# Patient Record
Sex: MALE | URBAN - METROPOLITAN AREA
[De-identification: names, ages, dates, MRNs, and addresses within clinical notes are randomized per-mention and may not be internally consistent; named-entity substitution may affect disease eponyms.]

---

## 2021-11-18 ENCOUNTER — PREPPED CHART (OUTPATIENT)
Dept: URBAN - METROPOLITAN AREA CLINIC 49 | Facility: CLINIC | Age: 61
End: 2021-11-18

## 2021-11-18 PROBLEM — H35.033 HYPERTENSIVE RETINOPATHY: Noted: 2021-11-18

## 2021-11-18 PROBLEM — Z79.4 DIABETIC MACULAR EDEMA: Noted: 2021-11-18

## 2021-11-18 PROBLEM — H33.322 OPERCULATED TEAR: Noted: 2021-11-18

## 2021-11-18 PROBLEM — E11.3313 DIABETIC MACULAR EDEMA: Noted: 2021-11-18

## 2021-11-18 PROBLEM — Z79.4 DIABETIC MACULAR EDEMA: Status: IMPROVING | Noted: 2021-11-18

## 2021-11-18 PROBLEM — E11.3313 MODERATE NONPROLIFERATIVE DIABETIC RETINOPATHY: Noted: 2021-11-18

## 2021-11-18 PROBLEM — Z79.4 MODERATE NONPROLIFERATIVE DIABETIC RETINOPATHY: Noted: 2021-11-18

## 2021-11-18 PROBLEM — E11.3313 DIABETIC MACULAR EDEMA: Status: IMPROVING | Noted: 2021-11-18

## 2021-11-18 PROBLEM — Z79.4 MODERATE NONPROLIFERATIVE DIABETIC RETINOPATHY: Status: IMPROVING | Noted: 2021-11-18

## 2021-11-18 PROBLEM — E11.3313 MODERATE NONPROLIFERATIVE DIABETIC RETINOPATHY: Status: IMPROVING | Noted: 2021-11-18

## 2021-11-18 PROBLEM — H25.13 NS CATARACT: Noted: 2021-11-18

## 2022-03-14 ASSESSMENT — VISUAL ACUITY
OD_PH: 20/30+2
OS_SC: 20/80+1
OD_SC: 20/50+2

## 2022-03-14 ASSESSMENT — TONOMETRY
OS_IOP_MMHG: 12
OD_IOP_MMHG: 12

## 2022-03-17 ENCOUNTER — FOLLOW UP (OUTPATIENT)
Dept: URBAN - METROPOLITAN AREA CLINIC 49 | Facility: CLINIC | Age: 62
End: 2022-03-17

## 2022-03-17 DIAGNOSIS — E11.3313: ICD-10-CM

## 2022-03-17 DIAGNOSIS — Z79.4: ICD-10-CM

## 2022-03-17 DIAGNOSIS — H33.322: ICD-10-CM

## 2022-03-17 PROCEDURE — 92201 OPSCPY EXTND RTA DRAW UNI/BI: CPT

## 2022-03-17 PROCEDURE — 99214 OFFICE O/P EST MOD 30 MIN: CPT

## 2022-03-17 PROCEDURE — 92134 CPTRZ OPH DX IMG PST SGM RTA: CPT

## 2022-03-17 ASSESSMENT — VISUAL ACUITY
OD_PH: 20/20-2
OS_SC: 20/60+1
OD_SC: 20/40+2

## 2022-03-17 ASSESSMENT — TONOMETRY
OD_IOP_MMHG: 14
OS_IOP_MMHG: 10

## 2022-07-21 ENCOUNTER — FOLLOW UP (OUTPATIENT)
Dept: URBAN - METROPOLITAN AREA CLINIC 49 | Facility: CLINIC | Age: 62
End: 2022-07-21

## 2022-07-21 DIAGNOSIS — Z79.4: ICD-10-CM

## 2022-07-21 DIAGNOSIS — E11.3313: ICD-10-CM

## 2022-07-21 DIAGNOSIS — H33.322: ICD-10-CM

## 2022-07-21 PROCEDURE — 92201 OPSCPY EXTND RTA DRAW UNI/BI: CPT

## 2022-07-21 PROCEDURE — 92134 CPTRZ OPH DX IMG PST SGM RTA: CPT

## 2022-07-21 PROCEDURE — 92014 COMPRE OPH EXAM EST PT 1/>: CPT

## 2022-07-21 ASSESSMENT — TONOMETRY
OS_IOP_MMHG: 10
OD_IOP_MMHG: 10

## 2022-07-21 ASSESSMENT — VISUAL ACUITY
OD_SC: 20/40
OS_SC: 20/70-2
OD_PH: 20/25

## 2022-10-04 ENCOUNTER — APPOINTMENT (RX ONLY)
Dept: URBAN - METROPOLITAN AREA CLINIC 41 | Facility: CLINIC | Age: 62
Setting detail: DERMATOLOGY
End: 2022-10-04

## 2022-10-04 DIAGNOSIS — L21.8 OTHER SEBORRHEIC DERMATITIS: ICD-10-CM | Status: INADEQUATELY CONTROLLED

## 2022-10-04 DIAGNOSIS — L73.8 OTHER SPECIFIED FOLLICULAR DISORDERS: ICD-10-CM | Status: STABLE

## 2022-10-04 DIAGNOSIS — L82.0 INFLAMED SEBORRHEIC KERATOSIS: ICD-10-CM | Status: INADEQUATELY CONTROLLED

## 2022-10-04 PROCEDURE — 99203 OFFICE O/P NEW LOW 30 MIN: CPT | Mod: 25

## 2022-10-04 PROCEDURE — ? PRESCRIPTION

## 2022-10-04 PROCEDURE — ? PRESCRIPTION MEDICATION MANAGEMENT

## 2022-10-04 PROCEDURE — ? LIQUID NITROGEN

## 2022-10-04 PROCEDURE — ? ADDITIONAL NOTES

## 2022-10-04 PROCEDURE — ? COUNSELING

## 2022-10-04 PROCEDURE — 17110 DESTRUCTION B9 LES UP TO 14: CPT

## 2022-10-04 PROCEDURE — ? FULL BODY SKIN EXAM - DECLINED

## 2022-10-04 RX ORDER — KETOCONAZOLE 20 MG/ML
SHAMPOO, SUSPENSION TOPICAL BIW
Qty: 120 | Refills: 3 | Status: ERX | COMMUNITY
Start: 2022-10-04

## 2022-10-04 RX ORDER — MOMETASONE FUROATE 1 MG/ML
SOLUTION TOPICAL
Qty: 30 | Refills: 1 | Status: ERX | COMMUNITY
Start: 2022-10-04

## 2022-10-04 RX ADMIN — MOMETASONE FUROATE: 1 SOLUTION TOPICAL at 00:00

## 2022-10-04 RX ADMIN — KETOCONAZOLE: 20 SHAMPOO, SUSPENSION TOPICAL at 00:00

## 2022-10-04 ASSESSMENT — LOCATION DETAILED DESCRIPTION DERM
LOCATION DETAILED: RIGHT INFERIOR MEDIAL FOREHEAD
LOCATION DETAILED: LEFT MEDIAL FRONTAL SCALP
LOCATION DETAILED: RIGHT LATERAL FOREHEAD

## 2022-10-04 ASSESSMENT — LOCATION ZONE DERM
LOCATION ZONE: SCALP
LOCATION ZONE: FACE

## 2022-10-04 ASSESSMENT — LOCATION SIMPLE DESCRIPTION DERM
LOCATION SIMPLE: RIGHT FOREHEAD
LOCATION SIMPLE: LEFT SCALP

## 2022-10-04 NOTE — PROCEDURE: FULL BODY SKIN EXAM - DECLINED
Detail Level: Simple
Price (Do Not Change): 0.00
Instructions: This plan will send the code FBSD to the PM system.  DO NOT or CHANGE the price.
Body Of Note (Please Add Your Own Text Here): Pt declined FBSE. LC advises importance of yearly skin exam and recommends one soon

## 2022-10-04 NOTE — PROCEDURE: LIQUID NITROGEN
Consent: The patient's consent was obtained including but not limited to risks of crusting, scabbing, blistering, scarring, darker or lighter pigmentary change, recurrence, incomplete removal and infection.
Spray Paint Technique: No
Medical Necessity Clause: This procedure was medically necessary because the lesions that were treated were:
Spray Paint Text: The liquid nitrogen was applied to the skin utilizing a spray paint frosting technique.
Show Applicator Variable?: Yes
Application Tool (Optional): Liquid Nitrogen Sprayer
Post-Care Instructions: I reviewed with the patient in detail post-care instructions. Patient is to wear sunprotection, and avoid picking at any of the treated lesions. Pt may apply Vaseline to crusted or scabbing areas.
Medical Necessity Information: It is in your best interest to select a reason for this procedure from the list below. All of these items fulfill various CMS LCD requirements except the new and changing color options.
Detail Level: Detailed

## 2022-10-04 NOTE — HPI: BUMPS
Is This A New Presentation, Or A Follow-Up?: Bumps
Additional History: - \\nNew pt here for bumps on scalp. Pt stares bumps are itching. Pt says bumps have gone away some but itchiness is still present.\\nPt went and saw pcp who gave pt prescription shampoo that helped but scalp was still itchy. PCP recommended to see dermatologist.\\nPt also has spot on face he picked at and started bleeding he wants looked at.\\n

## 2022-10-04 NOTE — PROCEDURE: PRESCRIPTION MEDICATION MANAGEMENT
Initiate Treatment: Mometasone solution drops QHS\\nKetaconazole 2% shampoo QD
Detail Level: Zone
Render In Strict Bullet Format?: No
yes

## 2022-10-18 ENCOUNTER — APPOINTMENT (RX ONLY)
Dept: URBAN - METROPOLITAN AREA CLINIC 41 | Facility: CLINIC | Age: 62
Setting detail: DERMATOLOGY
End: 2022-10-18

## 2022-10-18 DIAGNOSIS — L72.8 OTHER FOLLICULAR CYSTS OF THE SKIN AND SUBCUTANEOUS TISSUE: ICD-10-CM | Status: STABLE

## 2022-10-18 DIAGNOSIS — L21.8 OTHER SEBORRHEIC DERMATITIS: ICD-10-CM | Status: IMPROVED

## 2022-10-18 DIAGNOSIS — L57.0 ACTINIC KERATOSIS: ICD-10-CM | Status: INADEQUATELY CONTROLLED

## 2022-10-18 DIAGNOSIS — L82.0 INFLAMED SEBORRHEIC KERATOSIS: ICD-10-CM | Status: INADEQUATELY CONTROLLED

## 2022-10-18 PROCEDURE — 17000 DESTRUCT PREMALG LESION: CPT | Mod: 59

## 2022-10-18 PROCEDURE — ? PRESCRIPTION MEDICATION MANAGEMENT

## 2022-10-18 PROCEDURE — ? LIQUID NITROGEN

## 2022-10-18 PROCEDURE — 17110 DESTRUCTION B9 LES UP TO 14: CPT

## 2022-10-18 PROCEDURE — ? COUNSELING

## 2022-10-18 PROCEDURE — 99213 OFFICE O/P EST LOW 20 MIN: CPT | Mod: 25

## 2022-10-18 PROCEDURE — ? ADDITIONAL NOTES

## 2022-10-18 ASSESSMENT — LOCATION ZONE DERM
LOCATION ZONE: FACE
LOCATION ZONE: SCALP

## 2022-10-18 ASSESSMENT — LOCATION DETAILED DESCRIPTION DERM
LOCATION DETAILED: MID-OCCIPITAL SCALP
LOCATION DETAILED: RIGHT CENTRAL PARIETAL SCALP
LOCATION DETAILED: LEFT CENTRAL PARIETAL SCALP
LOCATION DETAILED: RIGHT SUPERIOR LATERAL MALAR CHEEK
LOCATION DETAILED: LEFT SUPERIOR LATERAL FOREHEAD

## 2022-10-18 ASSESSMENT — LOCATION SIMPLE DESCRIPTION DERM
LOCATION SIMPLE: LEFT FOREHEAD
LOCATION SIMPLE: RIGHT CHEEK
LOCATION SIMPLE: POSTERIOR SCALP
LOCATION SIMPLE: SCALP

## 2022-10-18 NOTE — PROCEDURE: LIQUID NITROGEN
Duration Of Freeze Thaw-Cycle (Seconds): 0
Post-Care Instructions: I reviewed with the patient in detail post-care instructions. Patient is to wear sunprotection, and avoid picking at any of the treated lesions. Pt may apply Vaseline to crusted or scabbing areas.
Render Note In Bullet Format When Appropriate: No
Show Applicator Variable?: Yes
Consent: The patient's consent was obtained including but not limited to risks of crusting, scabbing, blistering, scarring, darker or lighter pigmentary change, recurrence, incomplete removal and infection.
Detail Level: Detailed
Medical Necessity Clause: This procedure was medically necessary because the lesions that were treated were:
Medical Necessity Information: It is in your best interest to select a reason for this procedure from the list below. All of these items fulfill various CMS LCD requirements except the new and changing color options.
Spray Paint Text: The liquid nitrogen was applied to the skin utilizing a spray paint frosting technique.
Application Tool (Optional): Liquid Nitrogen Sprayer

## 2022-10-18 NOTE — PROCEDURE: COUNSELING
Detail Level: Zone
Detail Level: Detailed
Patient Specific Counseling (Will Not Stick From Patient To Patient): —\\nLC notes this can be surgically excised, but is benign and can be left alone. Pt defers treatment at this time

## 2022-10-18 NOTE — PROCEDURE: PRESCRIPTION MEDICATION MANAGEMENT
Continue Regimen: Ketoconazole shampoo (no refills needed at this time)\\nMometasone solution QD x 1 week and then PRN flares (no refills needed at this time)
Detail Level: Zone
Render In Strict Bullet Format?: No

## 2023-01-12 ENCOUNTER — FOLLOW UP (OUTPATIENT)
Dept: URBAN - METROPOLITAN AREA CLINIC 49 | Facility: CLINIC | Age: 63
End: 2023-01-12

## 2023-01-12 DIAGNOSIS — Z79.4: ICD-10-CM

## 2023-01-12 DIAGNOSIS — H33.322: ICD-10-CM

## 2023-01-12 DIAGNOSIS — E11.3313: ICD-10-CM

## 2023-01-12 PROCEDURE — 92134 CPTRZ OPH DX IMG PST SGM RTA: CPT

## 2023-01-12 PROCEDURE — 92202 OPSCPY EXTND ON/MAC DRAW: CPT

## 2023-01-12 PROCEDURE — 92014 COMPRE OPH EXAM EST PT 1/>: CPT

## 2023-01-12 ASSESSMENT — VISUAL ACUITY
OS_SC: 20/50
OD_SC: 20/40
OD_PH: 20/30+2

## 2023-01-12 ASSESSMENT — TONOMETRY
OD_IOP_MMHG: 9
OS_IOP_MMHG: 11

## 2023-05-11 ENCOUNTER — FOLLOW UP (OUTPATIENT)
Dept: URBAN - METROPOLITAN AREA CLINIC 49 | Facility: CLINIC | Age: 63
End: 2023-05-11

## 2023-05-11 DIAGNOSIS — H33.322: ICD-10-CM

## 2023-05-11 DIAGNOSIS — Z79.4: ICD-10-CM

## 2023-05-11 DIAGNOSIS — E11.3313: ICD-10-CM

## 2023-05-11 DIAGNOSIS — H04.123: ICD-10-CM

## 2023-05-11 PROCEDURE — 92014 COMPRE OPH EXAM EST PT 1/>: CPT

## 2023-05-11 PROCEDURE — 92202 OPSCPY EXTND ON/MAC DRAW: CPT

## 2023-05-11 PROCEDURE — 92134 CPTRZ OPH DX IMG PST SGM RTA: CPT

## 2023-05-11 ASSESSMENT — VISUAL ACUITY
OS_SC: 20/60-2
OD_SC: 20/30+2

## 2023-05-11 ASSESSMENT — TONOMETRY
OS_IOP_MMHG: 11
OD_IOP_MMHG: 13

## 2023-09-14 ENCOUNTER — FOLLOW UP (OUTPATIENT)
Dept: URBAN - METROPOLITAN AREA CLINIC 49 | Facility: CLINIC | Age: 63
End: 2023-09-14

## 2023-09-14 DIAGNOSIS — H04.123: ICD-10-CM

## 2023-09-14 DIAGNOSIS — E11.3313: ICD-10-CM

## 2023-09-14 DIAGNOSIS — H33.322: ICD-10-CM

## 2023-09-14 DIAGNOSIS — Z79.4: ICD-10-CM

## 2023-09-14 PROCEDURE — 92202 OPSCPY EXTND ON/MAC DRAW: CPT | Mod: NC

## 2023-09-14 PROCEDURE — 92134 CPTRZ OPH DX IMG PST SGM RTA: CPT

## 2023-09-14 PROCEDURE — 67028 INJECTION EYE DRUG: CPT

## 2023-09-14 PROCEDURE — 92014 COMPRE OPH EXAM EST PT 1/>: CPT | Mod: 25

## 2023-09-14 ASSESSMENT — TONOMETRY
OD_IOP_MMHG: 9
OS_IOP_MMHG: 6

## 2023-09-14 ASSESSMENT — VISUAL ACUITY
OD_SC: 20/30+2
OD_PH: 20/20-1
OS_SC: 20/50-2
OS_PH: 20/50

## 2024-01-11 ENCOUNTER — FOLLOW UP (OUTPATIENT)
Dept: URBAN - METROPOLITAN AREA CLINIC 49 | Facility: CLINIC | Age: 64
End: 2024-01-11

## 2024-01-11 DIAGNOSIS — H33.322: ICD-10-CM

## 2024-01-11 DIAGNOSIS — H04.123: ICD-10-CM

## 2024-01-11 DIAGNOSIS — Z79.4: ICD-10-CM

## 2024-01-11 DIAGNOSIS — E11.3313: ICD-10-CM

## 2024-01-11 PROCEDURE — 92014 COMPRE OPH EXAM EST PT 1/>: CPT

## 2024-01-11 PROCEDURE — 92202 OPSCPY EXTND ON/MAC DRAW: CPT

## 2024-01-11 PROCEDURE — 92134 CPTRZ OPH DX IMG PST SGM RTA: CPT

## 2024-01-11 ASSESSMENT — VISUAL ACUITY
OS_SC: 20/70
OD_PH: 20/25-2
OD_SC: 20/40+2

## 2024-01-11 ASSESSMENT — TONOMETRY
OD_IOP_MMHG: 10
OS_IOP_MMHG: 11

## 2024-02-29 ENCOUNTER — APPOINTMENT (RX ONLY)
Dept: URBAN - METROPOLITAN AREA CLINIC 41 | Facility: CLINIC | Age: 64
Setting detail: DERMATOLOGY
End: 2024-02-29

## 2024-02-29 DIAGNOSIS — L21.8 OTHER SEBORRHEIC DERMATITIS: ICD-10-CM

## 2024-02-29 PROCEDURE — ? MDM - TREATMENT GOALS

## 2024-02-29 PROCEDURE — ? PRESCRIPTION

## 2024-02-29 PROCEDURE — 99214 OFFICE O/P EST MOD 30 MIN: CPT

## 2024-02-29 PROCEDURE — ? PRESCRIPTION MEDICATION MANAGEMENT

## 2024-02-29 PROCEDURE — ? COUNSELING

## 2024-02-29 RX ORDER — HYDROXYZINE HYDROCHLORIDE 25 MG/1
TABLET, FILM COATED ORAL
Qty: 30 | Refills: 1 | Status: ERX | COMMUNITY
Start: 2024-02-29

## 2024-02-29 RX ORDER — CICLOPIROX 10 MG/.96ML
SHAMPOO TOPICAL
Qty: 120 | Refills: 2 | Status: ERX | COMMUNITY
Start: 2024-02-29

## 2024-02-29 RX ORDER — HYDROCORTISONE 25 MG/G
OINTMENT TOPICAL
Qty: 28.35 | Refills: 2 | Status: ERX | COMMUNITY
Start: 2024-02-29

## 2024-02-29 RX ORDER — CLOBETASOL PROPIONATE 0.5 MG/ML
SOLUTION TOPICAL
Qty: 50 | Refills: 2 | Status: ERX | COMMUNITY
Start: 2024-02-29

## 2024-02-29 RX ADMIN — HYDROXYZINE HYDROCHLORIDE: 25 TABLET, FILM COATED ORAL at 00:00

## 2024-02-29 RX ADMIN — CICLOPIROX: 10 SHAMPOO TOPICAL at 00:00

## 2024-02-29 RX ADMIN — CLOBETASOL PROPIONATE: 0.5 SOLUTION TOPICAL at 00:00

## 2024-02-29 RX ADMIN — HYDROCORTISONE: 25 OINTMENT TOPICAL at 00:00

## 2024-02-29 ASSESSMENT — LOCATION DETAILED DESCRIPTION DERM
LOCATION DETAILED: LEFT CENTRAL PARIETAL SCALP
LOCATION DETAILED: RIGHT CENTRAL EYEBROW
LOCATION DETAILED: RIGHT CENTRAL PARIETAL SCALP

## 2024-02-29 ASSESSMENT — LOCATION ZONE DERM
LOCATION ZONE: FACE
LOCATION ZONE: SCALP

## 2024-02-29 ASSESSMENT — LOCATION SIMPLE DESCRIPTION DERM
LOCATION SIMPLE: SCALP
LOCATION SIMPLE: RIGHT EYEBROW

## 2024-02-29 NOTE — PROCEDURE: COUNSELING
Detail Level: Zone
Patient Specific Counseling (Will Not Stick From Patient To Patient): -\\nDK counsels that there is significant inflammation present on scalp today. DK discusses stress, recent illness, changes in weather can all trigger this condition.\\n\\nPt notes difficulty sleep at night due to intense itching. DK discusses pt may begin use of hydroxyzine nightly as needed for itching as long as pt blood pressure is well controlled. Pt states his blood pressure is managed well with medication. DK counsels he will prescribe the hydroxyzine and pt should follow up w itch PCP to confirm prior to taking

## 2024-02-29 NOTE — PROCEDURE: PRESCRIPTION MEDICATION MANAGEMENT
Initiate Treatment: Ciclopirox shampoo BIW\\nHydrocortisone 2.5% ointment BID X 7-10 days \\nClobetasol solution QHS\\nHydroxyzine 25mg QHS (with PCP approval)
Discontinue Regimen: Ketoconazole shampoo\\nMometasone solution QD x 1 week and then PRN flares
Detail Level: Zone
Render In Strict Bullet Format?: No

## 2024-04-11 ENCOUNTER — APPOINTMENT (RX ONLY)
Dept: URBAN - METROPOLITAN AREA CLINIC 41 | Facility: CLINIC | Age: 64
Setting detail: DERMATOLOGY
End: 2024-04-11

## 2024-04-11 DIAGNOSIS — L21.8 OTHER SEBORRHEIC DERMATITIS: ICD-10-CM

## 2024-04-11 PROCEDURE — 99214 OFFICE O/P EST MOD 30 MIN: CPT

## 2024-04-11 PROCEDURE — ? MDM - TREATMENT GOALS

## 2024-04-11 PROCEDURE — ? COUNSELING

## 2024-04-11 PROCEDURE — ? PRESCRIPTION MEDICATION MANAGEMENT

## 2024-04-11 ASSESSMENT — LOCATION SIMPLE DESCRIPTION DERM
LOCATION SIMPLE: RIGHT EYEBROW
LOCATION SIMPLE: SCALP

## 2024-04-11 ASSESSMENT — LOCATION DETAILED DESCRIPTION DERM
LOCATION DETAILED: RIGHT CENTRAL PARIETAL SCALP
LOCATION DETAILED: LEFT CENTRAL PARIETAL SCALP
LOCATION DETAILED: RIGHT CENTRAL EYEBROW

## 2024-04-11 ASSESSMENT — LOCATION ZONE DERM
LOCATION ZONE: SCALP
LOCATION ZONE: FACE

## 2024-04-11 NOTE — PROCEDURE: PRESCRIPTION MEDICATION MANAGEMENT
Continue Regimen: Ciclopirox shampoo BIW\\nHydrocortisone 2.5% ointment BID X 7-10 days for face \\nClobetasol solution QHS for scalp \\nHydroxyzine 25mg QHS PRN itch (with PCP approval)
Detail Level: Zone
Render In Strict Bullet Format?: No

## 2024-04-11 NOTE — PROCEDURE: COUNSELING
Detail Level: Zone
Patient Specific Counseling (Will Not Stick From Patient To Patient): -\\nDK counsels on the importance of compliance with the rx. He notes that pt likely hasn’t seen good results because he hasn’t tried using the steroidal topicals and the nonsteriodals are not enough to resolve the issue. Pt says he doesn’t need refills of these rx because he hasn’t yet used them but he will try to initiate use.

## 2024-06-03 ENCOUNTER — FOLLOW UP (OUTPATIENT)
Dept: URBAN - METROPOLITAN AREA CLINIC 49 | Facility: CLINIC | Age: 64
End: 2024-06-03

## 2024-06-03 DIAGNOSIS — E11.3313: ICD-10-CM

## 2024-06-03 DIAGNOSIS — H35.033: ICD-10-CM

## 2024-06-03 DIAGNOSIS — H33.322: ICD-10-CM

## 2024-06-03 PROCEDURE — 92014 COMPRE OPH EXAM EST PT 1/>: CPT

## 2024-06-03 PROCEDURE — 92134 CPTRZ OPH DX IMG PST SGM RTA: CPT

## 2024-06-03 PROCEDURE — PFS EYLEA PFS: Mod: JZ

## 2024-06-03 PROCEDURE — 67028 INJECTION EYE DRUG: CPT

## 2024-06-03 PROCEDURE — 92202 OPSCPY EXTND ON/MAC DRAW: CPT | Mod: 25

## 2024-06-03 ASSESSMENT — VISUAL ACUITY
OD_PH: 20/25
OS_SC: 20/50+2
OD_SC: 20/30-2

## 2024-06-03 ASSESSMENT — TONOMETRY
OS_IOP_MMHG: 11
OD_IOP_MMHG: 13

## 2024-07-29 ENCOUNTER — FOLLOW UP (OUTPATIENT)
Dept: URBAN - METROPOLITAN AREA CLINIC 49 | Facility: CLINIC | Age: 64
End: 2024-07-29

## 2024-07-29 DIAGNOSIS — E11.3313: ICD-10-CM

## 2024-07-29 DIAGNOSIS — H33.322: ICD-10-CM

## 2024-07-29 DIAGNOSIS — Z79.4: ICD-10-CM

## 2024-07-29 DIAGNOSIS — H04.123: ICD-10-CM

## 2024-07-29 PROCEDURE — 92134 CPTRZ OPH DX IMG PST SGM RTA: CPT

## 2024-07-29 PROCEDURE — 92014 COMPRE OPH EXAM EST PT 1/>: CPT | Mod: 25

## 2024-07-29 PROCEDURE — 92202 OPSCPY EXTND ON/MAC DRAW: CPT | Mod: 59

## 2024-07-29 PROCEDURE — PFS EYLEA PFS: Mod: JZ

## 2024-07-29 PROCEDURE — 67028 INJECTION EYE DRUG: CPT

## 2024-07-29 ASSESSMENT — VISUAL ACUITY
OD_SC: 20/25-3
OS_SC: 20/60-1

## 2024-07-29 ASSESSMENT — TONOMETRY
OD_IOP_MMHG: 16
OS_IOP_MMHG: 14

## 2024-09-23 ENCOUNTER — FOLLOW UP (OUTPATIENT)
Dept: URBAN - METROPOLITAN AREA CLINIC 49 | Facility: CLINIC | Age: 64
End: 2024-09-23

## 2024-09-23 DIAGNOSIS — Z79.4: ICD-10-CM

## 2024-09-23 DIAGNOSIS — H33.322: ICD-10-CM

## 2024-09-23 DIAGNOSIS — E11.3313: ICD-10-CM

## 2024-09-23 DIAGNOSIS — H04.123: ICD-10-CM

## 2024-09-23 PROCEDURE — 92014 COMPRE OPH EXAM EST PT 1/>: CPT | Mod: 25

## 2024-09-23 PROCEDURE — 67028 INJECTION EYE DRUG: CPT

## 2024-09-23 PROCEDURE — 92202 OPSCPY EXTND ON/MAC DRAW: CPT | Mod: 59

## 2024-09-23 PROCEDURE — PFS EYLEA PFS: Mod: JZ

## 2024-09-23 PROCEDURE — 92134 CPTRZ OPH DX IMG PST SGM RTA: CPT

## 2024-09-23 ASSESSMENT — TONOMETRY
OD_IOP_MMHG: 11
OS_IOP_MMHG: 13

## 2024-09-23 ASSESSMENT — VISUAL ACUITY
OD_SC: 20/30-2
OS_SC: 20/70

## 2024-12-09 ENCOUNTER — FOLLOW UP (OUTPATIENT)
Dept: URBAN - METROPOLITAN AREA CLINIC 49 | Facility: CLINIC | Age: 64
End: 2024-12-09

## 2024-12-09 DIAGNOSIS — Z79.4: ICD-10-CM

## 2024-12-09 DIAGNOSIS — H04.123: ICD-10-CM

## 2024-12-09 DIAGNOSIS — E11.3313: ICD-10-CM

## 2024-12-09 DIAGNOSIS — H33.322: ICD-10-CM

## 2024-12-09 PROCEDURE — 92134 CPTRZ OPH DX IMG PST SGM RTA: CPT

## 2024-12-09 PROCEDURE — PFS EYLEA PFS: Mod: JZ

## 2024-12-09 PROCEDURE — 67028 INJECTION EYE DRUG: CPT

## 2024-12-09 PROCEDURE — 92202 OPSCPY EXTND ON/MAC DRAW: CPT | Mod: 59

## 2024-12-09 PROCEDURE — 92014 COMPRE OPH EXAM EST PT 1/>: CPT | Mod: 25

## 2024-12-09 ASSESSMENT — VISUAL ACUITY
OS_SC: 20/50-2
OD_SC: 20/25+2

## 2024-12-09 ASSESSMENT — TONOMETRY
OS_IOP_MMHG: 16
OD_IOP_MMHG: 15

## 2025-02-13 ENCOUNTER — FOLLOW UP (OUTPATIENT)
Dept: URBAN - METROPOLITAN AREA CLINIC 49 | Facility: CLINIC | Age: 65
End: 2025-02-13

## 2025-02-13 DIAGNOSIS — H33.322: ICD-10-CM

## 2025-02-13 DIAGNOSIS — H04.123: ICD-10-CM

## 2025-02-13 DIAGNOSIS — H35.033: ICD-10-CM

## 2025-02-13 DIAGNOSIS — E11.3313: ICD-10-CM

## 2025-02-13 PROCEDURE — 92014 COMPRE OPH EXAM EST PT 1/>: CPT | Mod: 25

## 2025-02-13 PROCEDURE — PFS EYLEA PFS: Mod: JZ

## 2025-02-13 PROCEDURE — 92202 OPSCPY EXTND ON/MAC DRAW: CPT | Mod: 59

## 2025-02-13 PROCEDURE — 92134 CPTRZ OPH DX IMG PST SGM RTA: CPT

## 2025-02-13 PROCEDURE — 67028 INJECTION EYE DRUG: CPT

## 2025-02-13 ASSESSMENT — TONOMETRY
OS_IOP_MMHG: 12
OD_IOP_MMHG: 11

## 2025-02-13 ASSESSMENT — VISUAL ACUITY
OS_SC: 20/70-1
OD_SC: 20/30-2

## 2025-04-10 ENCOUNTER — FOLLOW UP (OUTPATIENT)
Dept: URBAN - METROPOLITAN AREA CLINIC 49 | Facility: CLINIC | Age: 65
End: 2025-04-10

## 2025-04-10 DIAGNOSIS — H04.123: ICD-10-CM

## 2025-04-10 DIAGNOSIS — H33.322: ICD-10-CM

## 2025-04-10 DIAGNOSIS — E11.3313: ICD-10-CM

## 2025-04-10 PROCEDURE — 92202 OPSCPY EXTND ON/MAC DRAW: CPT | Mod: 59

## 2025-04-10 PROCEDURE — 92014 COMPRE OPH EXAM EST PT 1/>: CPT | Mod: 25

## 2025-04-10 PROCEDURE — 67028 INJECTION EYE DRUG: CPT

## 2025-04-10 PROCEDURE — 92134 CPTRZ OPH DX IMG PST SGM RTA: CPT

## 2025-04-10 ASSESSMENT — TONOMETRY
OD_IOP_MMHG: 11
OS_IOP_MMHG: 7

## 2025-04-10 ASSESSMENT — VISUAL ACUITY
OD_SC: 20/30-1
OS_SC: 20/60